# Patient Record
Sex: FEMALE | URBAN - METROPOLITAN AREA
[De-identification: names, ages, dates, MRNs, and addresses within clinical notes are randomized per-mention and may not be internally consistent; named-entity substitution may affect disease eponyms.]

---

## 2019-10-16 ENCOUNTER — OFFICE VISIT (OUTPATIENT)
Dept: ORTHOPEDIC SURGERY | Age: 64
End: 2019-10-16

## 2019-10-16 VITALS
DIASTOLIC BLOOD PRESSURE: 110 MMHG | WEIGHT: 123 LBS | RESPIRATION RATE: 16 BRPM | SYSTOLIC BLOOD PRESSURE: 157 MMHG | HEART RATE: 56 BPM | TEMPERATURE: 98 F

## 2019-10-16 DIAGNOSIS — M54.16 LEFT LUMBAR RADICULOPATHY: Primary | ICD-10-CM

## 2019-10-16 DIAGNOSIS — M71.30 SYNOVIAL CYST: ICD-10-CM

## 2019-10-16 RX ORDER — MONTELUKAST SODIUM 10 MG/1
TABLET ORAL
COMMUNITY
Start: 2016-08-31

## 2019-10-16 RX ORDER — CETIRIZINE HCL 10 MG
10 TABLET ORAL
COMMUNITY
Start: 2014-02-25

## 2019-10-16 RX ORDER — NAPROXEN 500 MG/1
TABLET ORAL
Qty: 30 TAB | Refills: 0 | Status: SHIPPED | OUTPATIENT
Start: 2019-10-16 | End: 2019-10-30

## 2019-10-16 RX ORDER — AMLODIPINE BESYLATE 10 MG/1
TABLET ORAL
COMMUNITY
Start: 2019-02-27

## 2019-10-16 NOTE — PATIENT INSTRUCTIONS
Back Pain: Care Instructions  Your Care Instructions    Back pain has many possible causes. It is often related to problems with muscles and ligaments of the back. It may also be related to problems with the nerves, discs, or bones of the back. Moving, lifting, standing, sitting, or sleeping in an awkward way can strain the back. Sometimes you don't notice the injury until later. Arthritis is another common cause of back pain. Although it may hurt a lot, back pain usually improves on its own within several weeks. Most people recover in 12 weeks or less. Using good home treatment and being careful not to stress your back can help you feel better sooner. Follow-up care is a key part of your treatment and safety. Be sure to make and go to all appointments, and call your doctor if you are having problems. It's also a good idea to know your test results and keep a list of the medicines you take. How can you care for yourself at home? · Sit or lie in positions that are most comfortable and reduce your pain. Try one of these positions when you lie down:  ? Lie on your back with your knees bent and supported by large pillows. ? Lie on the floor with your legs on the seat of a sofa or chair. ? Lie on your side with your knees and hips bent and a pillow between your legs. ? Lie on your stomach if it does not make pain worse. · Do not sit up in bed, and avoid soft couches and twisted positions. Bed rest can help relieve pain at first, but it delays healing. Avoid bed rest after the first day of back pain. · Change positions every 30 minutes. If you must sit for long periods of time, take breaks from sitting. Get up and walk around, or lie in a comfortable position. · Try using a heating pad on a low or medium setting for 15 to 20 minutes every 2 or 3 hours. Try a warm shower in place of one session with the heating pad. · You can also try an ice pack for 10 to 15 minutes every 2 to 3 hours.  Put a thin cloth between the ice pack and your skin. · Take pain medicines exactly as directed. ? If the doctor gave you a prescription medicine for pain, take it as prescribed. ? If you are not taking a prescription pain medicine, ask your doctor if you can take an over-the-counter medicine. · Take short walks several times a day. You can start with 5 to 10 minutes, 3 or 4 times a day, and work up to longer walks. Walk on level surfaces and avoid hills and stairs until your back is better. · Return to work and other activities as soon as you can. Continued rest without activity is usually not good for your back. · To prevent future back pain, do exercises to stretch and strengthen your back and stomach. Learn how to use good posture, safe lifting techniques, and proper body mechanics. When should you call for help? Call your doctor now or seek immediate medical care if:    · You have new or worsening numbness in your legs.     · You have new or worsening weakness in your legs. (This could make it hard to stand up.)     · You lose control of your bladder or bowels.    Watch closely for changes in your health, and be sure to contact your doctor if:    · You have a fever, lose weight, or don't feel well.     · You do not get better as expected. Where can you learn more? Go to http://shawanda-steve.info/. Enter T661 in the search box to learn more about \"Back Pain: Care Instructions. \"  Current as of: June 26, 2019  Content Version: 12.2  © 1869-0768 [employer]. Care instructions adapted under license by [employer] (which disclaims liability or warranty for this information). If you have questions about a medical condition or this instruction, always ask your healthcare professional. [place] [address] any warranty or liability for your use of this information.          Sciatica: Care Instructions  Your Care Instructions    Sciatica (say \"efg-LT-bm-kuh\") is an irritation of one of the sciatic nerves, which come from the spinal cord in the lower back. The sciatic nerves and their branches extend down through the buttock to the foot. Sciatica can develop when an injured disc in the back presses against a spinal nerve root. Its main symptom is pain, numbness, or weakness that is often worse in the leg or foot than in the back. Sciatica often will improve and go away with time. Early treatment usually includes medicines and exercises to relieve pain. Follow-up care is a key part of your treatment and safety. Be sure to make and go to all appointments, and call your doctor if you are having problems. It's also a good idea to know your test results and keep a list of the medicines you take. How can you care for yourself at home? · Take pain medicines exactly as directed. ? If the doctor gave you a prescription medicine for pain, take it as prescribed. ? If you are not taking a prescription pain medicine, ask your doctor if you can take an over-the-counter medicine. · Use heat or ice to relieve pain. ? To apply heat, put a warm water bottle, heating pad set on low, or warm cloth on your back. Do not go to sleep with a heating pad on your skin. ? To use ice, put ice or a cold pack on the area for 10 to 20 minutes at a time. Put a thin cloth between the ice and your skin. · Avoid sitting if possible, unless it feels better than standing. · Alternate lying down with short walks. Increase your walking distance as you are able to without making your symptoms worse. · Do not do anything that makes your symptoms worse. When should you call for help? Call 911 anytime you think you may need emergency care. For example, call if:    · You are unable to move a leg at all.   [facility] your doctor now or seek immediate medical care if:    · You have new or worse symptoms in your legs or buttocks. Symptoms may include:  ? Numbness or tingling. ? Weakness.   ? Pain.     · You lose bladder or bowel control.    Watch closely for changes in your health, and be sure to contact your doctor if:    · You are not getting better as expected. Where can you learn more? Go to http://shawanda-steve.info/. Enter [phone] in the search box to learn more about \"Sciatica: Care Instructions. \"  Current as of: June 26, 2019  Content Version: 12.2  © 5751-1064 [employer]. Care instructions adapted under license by [employer] (which disclaims liability or warranty for this information). If you have questions about a medical condition or this instruction, always ask your healthcare professional. [address] any warranty or liability for your use of this information.

## 2019-10-16 NOTE — PROGRESS NOTES
[address]  [address]  [place], [address] [place] [address] Street  Phone: [phone]  Fax: [phone]        Samantha Sunshine  : 1955  PCP: Dax Garcia MD      NEW PATIENT      ASSESSMENT AND PLAN     Diagnoses and all orders for this visit:    1. Left lumbar radiculopathy  -     REFERRAL TO PHYSICAL THERAPY  -     naproxen (NAPROSYN) 500 mg tablet; Take 1 PO Daily with food X 2 weeks    2. Synovial cyst       1. Advised to stay active as tolerated. 2. Discussed life style modification, PT, medication, spinal injection, and surgery as treatment options  3. Referral to PT  4. Rx of Naprosyn. Take consistently for 2 weeks. 5. Avoid repetitive bending, lifting, and twisting. Avoid lifting grandchildren - let them sit on lap. 6. Given information on back pain, sciatica    F/U 1 month      [address] [facility] To[facility] is seen today in consultation at the request of Dr. Cecilio Burch for complaints of back, hip, and thigh pain. HISTORY OF PRESENT ILLNESS  Elzbieta Flynn is a 59 y.o. female. Today pt c/o back, hip and thigh pain of 4 month duration. Pt denies any specific incident or injury that caused their pain. Pt has daily pain with better and worse days. She reports immediate pain with rising. Pt continues to grocery shop, but experiences pain. She notes she takes a break while cooking to sit down to relieve her pain. She notes new L toe numbness. Pt notes she experiences pain that wakes her while laying on her L side. Denies having prednisone since her pain began. Pt notes she prefers not to take medication. Location of pain: back  Does pain radiate into extremities: LLE hip, thigh throbbing. Toe numbness  Does patient have weakness: LLE gives out   Pt denies saddle paresthesias. Medications pt is on: BC powder temporary benefit. Pt denies recent ED visits or hospitalizations.  Denies persistent fevers, chills, weight changes, neurogenic bowel or bladder symptoms. Treatments patient has tried:  Physical therapy:No  Doing HEP: Unknown  Non-opioid medications: Yes  Spinal injections: No  Spinal surgery- No.   Last L MRI 2019: narrowing with effusion on L at L4-5     reviewed. PMHx of HTN. Pt is unemployed outside the home. Pain Assessment  10/16/2019   Location of Pain Back;Hip; Toe   Location Modifiers Left   Severity of Pain 6   Quality of Pain Aching   Quality of Pain Comment numbness tingling   Duration of Pain Persistent   Frequency of Pain Constant   Aggravating Factors (No Data)   Aggravating Factors Comment positional   Relieving Factors Nothing       MRI lumbar spine 8/22/19  Result Impression   :    1. Transitional lumbosacral anatomy as above. 2. Moderate left foraminal stenosis at L4-L5, potentially affecting the left L4 nerve root. Recommend correlation for evidence of left L4 radicular symptoms. 3. Moderate facet arthropathy on the left at the L3-L4 and more pronounced at the L4-L5 level with an adjacent synovial cyst, trace left facet joint effusion, and surrounding edema compatible synovitis. These findings are most likely degenerative. Inflammatory/infectious synovitis considered much less likely unless there are clinical signs of infection. PAST MEDICAL HISTORY   Past Medical History:   Diagnosis Date    Hypertension        Past Surgical History:   Procedure Laterality Date    HX DILATION AND CURETTAGE         MEDICATIONS      Current Outpatient Medications   Medication Sig Dispense Refill    amLODIPine (NORVASC) 10 mg tablet amlodipine 10 mg tablet      cetirizine (ZYRTEC) 10 mg tablet Take 10 mg by mouth.  montelukast (SINGULAIR) 10 mg tablet Singulair 10 mg tablet   Take 1 tablet every day by oral route.       naproxen (NAPROSYN) 500 mg tablet Take 1 PO Daily with food X 2 weeks 30 Tab 0       ALLERGIES  Allergies not on file       SOCIAL HISTORY    Social History     Socioeconomic History    Marital status:      Spouse name: Not on file    Number of children: Not on file    Years of education: Not on file    Highest education level: Not on file   Occupational History    Not on file   Social Needs    Financial resource strain: Not on file    Food insecurity:     Worry: Not on file     Inability: Not on file    Transportation needs:     Medical: Not on file     Non-medical: Not on file   Tobacco Use    Smoking status: Never Smoker    Smokeless tobacco: Never Used   Substance and Sexual Activity    Alcohol use: Not on file    Drug use: Not on file    Sexual activity: Not on file   Lifestyle    Physical activity:     Days per week: Not on file     Minutes per session: Not on file    Stress: Not on file   Relationships    Social connections:     Talks on phone: Not on file     Gets together: Not on file     Attends Alevism service: Not on file     Active member of club or organization: Not on file     Attends meetings of clubs or organizations: Not on file     Relationship status: Not on file    Intimate partner violence:     Fear of current or ex partner: Not on file     Emotionally abused: Not on file     Physically abused: Not on file     Forced sexual activity: Not on file   Other Topics Concern     Service Not Asked    Blood Transfusions Not Asked    Caffeine Concern Not Asked    Occupational Exposure Not Asked   Anshu Kotyk Hazards Not Asked    Sleep Concern Not Asked    Stress Concern Not Asked    Weight Concern Not Asked    Special Diet Not Asked    Back Care Not Asked    Exercise Not Asked    Bike Helmet Not Asked    Seat Belt Not Asked    Self-Exams Not Asked   Social History Narrative    Not on file       FAMILY HISTORY    Family History   Problem Relation Age of Onset    Heart Disease Mother     Diabetes Father     Cancer Sister          REVIEW OF SYSTEMS  Review of Systems   Constitutional: Negative for chills, fever and weight loss. Respiratory: Negative for shortness of breath. Cardiovascular: Negative for chest pain. Gastrointestinal: Negative for constipation. Negative for fecal incontinence   Genitourinary: Negative for dysuria. Negative for urinary incontinence   Musculoskeletal:        Per HPI   Skin: Negative for rash. Neurological: Positive for tingling and focal weakness. Negative for dizziness, tremors and headaches. Endo/Heme/Allergies: Does not bruise/bleed easily. Psychiatric/Behavioral: The patient does not have insomnia. PHYSICAL EXAMINATION  Visit Vitals  BP (!) 157/110 (BP 1 Location: Left arm, BP Patient Position: Sitting)   Pulse (!) 56   Temp 98 °F (36.7 °C) (Oral)   Resp 16   Wt 123 lb (55.8 kg)          Accompanied by self. Constitutional:  Well developed, well nourished, in no acute distress. Psychiatric: Affect and mood are appropriate. Integumentary: No rashes or abrasions noted on exposed areas. Cardiovascular/Peripheral Vascular: No peripheral edema is noted BLE. SPINE/MUSCULOSKELETAL EXAM        Lumbar spine:  No rash, ecchymosis, or gross obliquity. No fasciculations. No focal atrophy is noted. Tenderness to palpation none lumbar spine. No tenderness to palpation at the sciatic notch. SI joints non-tender. Trochanters non tender. MOTOR:       Hip Flex  Quads Hamstrings Ankle DF EHL Ankle PF   Right +4/5 +4/5 +4/5 +4/5 +4/5 +4/5   Left +4/5 +4/5 +4/5 +4/5 4/5 +4/5   Mild eversion weakness on L. DTRs are 2+ patella, and 1+ Achilles. Straight Leg raise negative. No difficulty with tandem gait. LLE pain with Heel walk. LLE pain with Toe rise. Ambulation without assistive device. FWB. Written by Estrellita Valentino, as dictated by Virginia Sage MD.    I, Dr. Virginia Sage MD, confirm that all documentation is accurate. Ms. Trisha Mitchell may have a reminder for a \"due or due soon\" health maintenance.  I have asked that she contact her primary care provider for follow-up on this health maintenance.

## 2019-10-16 NOTE — PROGRESS NOTES
Verbal order Per Dr. James Mensah PT- Left Lumbar Radiculopathy; Naprosyn 500 mg 1 PO BID with food X 2 weeks Dispense 30 and 0 refills

## 2019-10-16 NOTE — PROGRESS NOTES
Josue Sauceda presents today for   Chief Complaint   Patient presents with    Back Pain     NEW PATIENT    Hip Pain       Is someone accompanying this pt? NO    Is the patient using any DME equipment during OV? NO    Depression Screening:  3 most recent PHQ Screens 10/16/2019   Little interest or pleasure in doing things Not at all   Feeling down, depressed, irritable, or hopeless Not at all   Total Score PHQ 2 0       Learning Assessment:  Learning Assessment 10/16/2019   PRIMARY LEARNER Patient   PRIMARY LANGUAGE ENGLISH   LEARNER PREFERENCE PRIMARY DEMONSTRATION   ANSWERED BY patient   RELATIONSHIP SELF         Coordination of Care:  1. Have you been to the ER, urgent care clinic since your last visit? NO  Hospitalized since your last visit? NO    2. Have you seen or consulted any other health care providers outside of the [address] [place] [address] since your last visit? NO Include any pap smears or colon screening.  NO    Last  Checked 10/16/19

## 2019-10-24 ENCOUNTER — HOSPITAL ENCOUNTER (OUTPATIENT)
Dept: PHYSICAL THERAPY | Age: 64
Discharge: HOME | End: 2019-10-24
Payer: COMMERCIAL

## 2019-10-24 PROCEDURE — 97110 THERAPEUTIC EXERCISES: CPT

## 2019-10-24 PROCEDURE — 97161 PT EVAL LOW COMPLEX 20 MIN: CPT

## 2019-10-24 PROCEDURE — 97140 MANUAL THERAPY 1/> REGIONS: CPT

## 2019-10-24 NOTE — PROGRESS NOTES
PT DAILY TREATMENT NOTE 10-18    Patient Name: Starr Williamson  Date:10/24/2019  : 1955  [x]  Patient  Verified  Payor: Wendy Pierce / Plan: [id number] [employer] [place] HMO / Product Type: HMO /    In time:1108  Out time:1157  Total Treatment Time (min): 52  Visit #: 1 of 10    Medicare/BCBS Only   Total Timed Codes (min):    1:1 Treatment Time:          Treatment Area: Lower back pain [M54.5]      SUBJECTIVE  Pain Level (0-10 scale): 1  []constant [x]intermittent []improving []worsening []no change since onset     Any medication changes, allergies to medications, adverse drug reactions, diagnosis change, or new procedure performed?: [x] No    [] Yes (see summary sheet for update)  Subjective functional status/changes:       Subjective: Patient is a 59 y.o. female referred to PT with the above Dx. Patient seen today for c/o pain in the left side of the back and going into the left LE with numbness in the left foot/big toe. Limited ability to stand for long periods when cooking, when working out in the yard or cleaning house. Pain in the low back when standing after bending forward working in the yard. Pain when holding 3year old grand daughter. Onset Apri or May 2019 with worsening during usual activity. Pt reports that she was reaching for something and felt a pull in her back. Limited walking tolerance due to pain       Patient presents to PT with an impaired gait, decreased balance, decreased strength, decreased flexibility, and decreased mobility. Patient s/s appear to be consistent w/ diagnosis. Patient demonstrates the potential to make functional gains within a reasonable time frame. Patient will benefit from skilled PT to address impairments and improve functional mobility, strength, gait and balance for an improved quality of life.   Fall Risk Assessment: Patient demonstrates no Fall Risk     Mechanism of Injury: None noted    Comorbidities: HTN, OA,     FOTO: 50 / 66 in 11 visits    Goal: Be able to walk around and do things without the sharp pain    PLOF: No left back pain or radicular pain in the left LE. Able to work in the yard and do all standing and cooking without difficulty. Able to hold grand baby without difficulty    Health Status: Good      What type of work do you do? N/A    Living Situation/Domestic Life: Lives at home with  and son in a single story home with steps going into the home  Mobility: (I)  Self Care (I)    What type of daily activities/hobbies?  Yard work, cooking,    Limitations to Activity/Recreation/PLOF: Limited standing, walking lifting     Barriers: [x]pain []financial []time []transportation []other    Motivation: High    FABQ Score: []low []elevate    Cognition: A & O x 3    Other:    Risk For Falls:   [x]No  []low []elevate           OBJECTIVE/EXAMINATION  Demonstrated Balance: Good       SLS: Right 30  Left  30     Tandem Stance:  NT        Romberg:  EO 30   EC 30  Mobility: (I)  Gait: Decr (B) pelvic sway, Decr (B) Hip Flx, Left Antalgic gait  - Depressed left crest  - Post Rot left innominate with depressed left ASIS  - decrease mobility (B) innominate in storks stance  - Fingertip to floor 9cm  - Hip flx MMT right 4+ left 4  - (+) Left Piriformis test  - (+) Left Slump Test  - (B) trunk rot at 60%  - TTP left ADD/Soleus/Gastroc/post knee/lateral thigh             20 min [x]Eval                  []Re-Eval         14 min Therapeutic Exercise:  [] See flow sheet : develop and instruct HEP   Rationale: increase ROM, increase strength and improve coordination to improve the patients ability to tolerate normal activity     10 min Manual Therapy:  (B) Innominate p/a mobs, (B) LE LAD, (B) L/S and T/S Rot mobs     Rationale: decrease pain, increase ROM and increase tissue extensibility to perform normal activity                                         With   [x] TE   [] TA   [] neuro   [] other: Patient Education: [x] Review HEP    [] Progressed/Changed HEP based on:   [] positioning   [] body mechanics   [] transfers   [] heat/ice application    [] other:       Other Objective/Functional Measures:        Pain Level (0-10 scale) post treatment: 3-4     ASSESSMENT/Changes in Function:   - Improved pelvic alignment and mobility after treatment      [x]  See Plan of Care  []  See progress note/recertification  []  See Discharge Summary           Progress towards goals / Updated goals:  Short Term Goals: To be accomplished in 5 treatments:  1.  Pt will be compliant and independent with HEP in order to facilitate PT sessions and aid with self management             Eval Status:  Initiated             Current Status:  2.  Pt to tolerate 30 min or more of TE and/or Interventions w/o increased s/s             Eval Status:  Initiated             Current Status:     Long Term Goals: To be accomplished in 10 treatments:  1.  Pt will report 50% improvement or better with low back pain to show a significant increase in function and the ability to perform daily activity with little to no sharp pain that limits activity             Eval Status:  Initiated             Current Status:  2.  Pt will have a negative left slump test to aid in decreasing neural tension in the left LE and decrease numbness in the left isaiah/big toe to tolerate standing to perform yard work  [employer] Status:  Can't stand long without numbness in the left foot/great toe             Current Status:  3.  Pt will demonstrate the ability to lift and hold 20# for 1 min to be able to lift and carry grand daughter without difficulty             Eval Status:  Not able to hold grand daughter without difficulty             Current Status:  4.  Pt will demonstrate the ability to stand and perform general activity/exercise for 30 min or more for carryover to standing to prepare a meal with little difficulty               Eval Status:  Initiated             Current Status:  5.  Pt will improve FOTO score to 66 in 11 visits to show significant improvement in function for progress to little difficulty performing usual work or house hold activity             Eval Status: 50             Current Status:    PLAN  [x]  Upgrade activities as tolerated     [x]  Continue plan of care  []  Update interventions per flow sheet       []  Discharge due to:_  []  Other:_         Jose Perkins, PT 10/24/2019  11:07 AM    Future Appointments   Date Time Provider José Dailey   11/21/2019  3:15 PM Ninoska Chester  [address]

## 2019-10-24 NOTE — PROGRESS NOTES
[facility] at [address] [place] [address] [address], [address] [place]  [place], [facility] [address]  Phone: [phone]      Fax:  [phone]    Plan of Care/ Statement of Necessity for Physical Therapy Services  Patient name: Venu Martin Start of Care: 10/24/2019   Referral source: Ninoska Chester MD : 1955    Medical Diagnosis: Lower back pain [M54.5]  Payor: Aster Casillas / Plan: Atiya Don HMO / Product Type: HMO /  Onset Date:April - May with recent worsening during usual activity    Treatment Diagnosis: Neural tension left LE, Impaired gait, pain with walking   Prior Hospitalization: see medical history Provider#: [id number]   Medications: Verified on Patient summary List    Comorbidities: HTN, OA   Prior Level of Function: No left back pain or radicular pain in the left LE. Able to work in the yard and do all standing and cooking without difficulty. Able to hold grand baby without difficulty      The Plan of Care and following information is based on the information from the initial evaluation. Assessment/ key information: Patient is a 59 y.o. female referred to PT with the above Dx. Patient seen today for c/o pain in the left side of the back and going into the left LE with numbness in the left foot/big toe. Limited ability to stand for long periods when cooking, when working out in the yard or cleaning house. Pain in the low back when standing after bending forward working in the yard. Pain when holding 3year old grand daughter. Onset Apri or May 2019 with worsening during usual activity. Pt reports that she was reaching for something and felt a pull in her back. Limited walking tolerance due to pain        Patient presents to PT with an impaired gait, decreased strength, decreased flexibility, and decreased mobility of the left LE. She has a pelvic obliquity, (+) Left slump test, (+) Left piriformis test,   Patient s/s appear to be consistent w/ diagnosis.   Patient demonstrates the potential to make functional gains within a reasonable time frame. Patient will benefit from skilled PT to address impairments and improve functional mobility, strength, gait and balance for an improved quality of life.   Fall Risk Assessment: Patient demonstrates no Fall Risk   Evaluation Complexity History LOW Complexity : Zero comorbidities / personal factors that will impact the outcome / POC; Examination MEDIUM Complexity : 3 Standardized tests and measures addressing body structure, function, activity limitation and / or participation in recreation  ;Presentation LOW Complexity : Stable, uncomplicated  ;Clinical Decision Making MEDIUM Complexity : FOTO score of 26-74  Overall Complexity Rating: LOW   Problem List: pain affecting function, decrease ROM, decrease strength, impaired gait/ balance, decrease ADL/ functional abilitiies, decrease activity tolerance, decrease flexibility/ joint mobility, decrease transfer abilities and other FOTO = 50   Treatment Plan may include any combination of the following: Therapeutic exercise, Therapeutic activities, Neuromuscular re-education, Physical agent/modality, Gait/balance training, Manual therapy, Patient education, Self Care training, Functional mobility training and Home safety training  Patient / Family readiness to learn indicated by: asking questions, trying to perform skills and interest  Persons(s) to be included in education: patient (P)  Barriers to Learning/Limitations: None  Patient Goal (s): Be able to walk around and do things without the sharp pain  Patient Self Reported Health Status: good  Rehabilitation Potential: good    Short Term Goals: To be accomplished in 5 treatments:  1.  Pt will be compliant and independent with HEP in order to facilitate PT sessions and aid with self management             Eval Status:  Initiated             Current Status:  2.  Pt to tolerate 30 min or more of TE and/or Interventions w/o increased s/s             Eval Status:  Initiated             Current Status:     Long Term Goals: To be accomplished in 10 treatments:  1.  Pt will report 50% improvement or better with low back pain to show a significant increase in function and the ability to perform daily activity with little to no sharp pain that limits activity             Eval Status:  Initiated             Current Status:  2.  Pt will have a negative left slump test to aid in decreasing neural tension in the left LE and decrease numbness in the left isaiah/big toe to tolerate standing to perform yard work  [employer] Status:  Can't stand long without numbness in the left foot/great toe             Current Status:  3.  Pt will demonstrate the ability to lift and hold 20# for 1 min to be able to lift and carry grand daughter without difficulty             Eval Status:  Not able to hold grand daughter without difficulty             Current Status:  4.  Pt will demonstrate the ability to stand and perform general activity/exercise for 30 min or more for carryover to standing to prepare a meal with little difficulty               Eval Status:  Initiated             Current Status:  5.  Pt will improve FOTO score to 66 in 11 visits to show significant improvement in function for progress to little difficulty performing usual work or house hold activity             Eval Status: 50             Current Status:    Frequency / Duration: Patient to be seen 2-3 times per week for 10 treatments. Patient/ Caregiver education and instruction: Diagnosis, prognosis, self care, activity modification and exercises   [x]  Plan of care has been reviewed with FRANKLIN Ybarra PT 10/24/2019 11:07 AM  _____________________________________________________________________  I certify that the above Therapy Services are being furnished while the patient is under my care. I agree with the treatment plan and certify that this therapy is necessary.     [facility] Signature:____________Date:_________TIME:________    [facility] [employer], Date and Time must be completed for valid certification **    Please sign and return to [facility] at [address] [facility]  [place], [facility] [address] [facility] [address]  Phone: [phone]      Fax:  [phone]

## 2019-10-28 ENCOUNTER — HOSPITAL ENCOUNTER (OUTPATIENT)
Dept: PHYSICAL THERAPY | Age: 64
Discharge: HOME | End: 2019-10-28
Payer: COMMERCIAL

## 2019-10-28 PROCEDURE — 97140 MANUAL THERAPY 1/> REGIONS: CPT

## 2019-10-28 PROCEDURE — 97110 THERAPEUTIC EXERCISES: CPT

## 2019-10-28 NOTE — PROGRESS NOTES
PT DAILY TREATMENT NOTE 10-18    Patient Name: Mark Khan  Date:10/28/2019  : 1955  [x]  Patient  Verified  Payor: Kam Bashir / Plan: [id number] HMO / Product Type: HMO /    In time:0905  Out [id number]  Total Treatment Time (min): 41  Visit #: 2 of 10    Medicare/BCBS Only   Total Timed Codes (min):    1:1 Treatment Time:          Treatment Area: Lower back pain [M54.5]    SUBJECTIVE  Pain Level (0-10 scale): 0  Any medication changes, allergies to medications, adverse drug reactions, diagnosis change, or new procedure performed?: [x] No    [] Yes (see summary sheet for update)  Subjective functional status/changes:   [] No changes reported  Doing much better since initial visit    OBJECTIVE        31 min Therapeutic Exercise:  [] See flow sheet :   Rationale: increase ROM, increase strength and improve coordination to improve the patients ability to tolerate increased activity    10 min Manual Therapy:  Inf glide right sacral base, (B) Innominate p/a mobs, (B) LE LAD, (B) L/S and T/S Rot mobs     Rationale: decrease pain, increase ROM and increase tissue extensibility to tolerate increased activity          With   [] TE   [] TA   [] neuro   [] other: Patient Education: [x] Review HEP    [] Progressed/Changed HEP based on:   [] positioning   [] body mechanics   [] transfers   [] heat/ice application    [] other:      Other Objective/Functional Measures:   - elevated right sacral base     Pain Level (0-10 scale) post treatment: 1    ASSESSMENT/Changes in Function:   - Improved mobility and alignment after treatment  - Minor increased pain after performing exercises today    Patient will continue to benefit from skilled PT services to modify and progress therapeutic interventions, address functional mobility deficits, address ROM deficits, address strength deficits, analyze and address soft tissue restrictions and analyze and cue movement patterns to attain remaining goals.      []  See Plan of Care  [] See progress note/recertification  []  See Discharge Summary         Progress towards goals / Updated goals:  Short Term Goals: To be accomplished in 5 treatments:  1. Pt will be compliant and independent with HEP in order to facilitate PT sessions and aid with self management             Eval Status:  Initiated             Current Status: Patient reports compliance with HEP with minor questions on technique 10/28/19  2. Pt to tolerate 30 min or more of TE and/or Interventions w/o increased s/s             Eval Status:  Initiated             Current Status: Progressing at 41 min with minor increased pain to 1/10 10/28/19     Long Term Goals: To be accomplished in 10 treatments:  1.  1.  Pt will report 50% improvement or better with low back pain to show a significant increase in function and the ability to perform daily activity with little to no sharp pain that limits activity             Eval Status:  Initiated             Current Status:  2. Pt will have a negative left slump test to aid in decreasing neural tension in the left LE and decrease numbness in the left isaiah/big toe to tolerate standing to perform yard work             Eval Status:  Can't stand long without numbness in the left foot/great toe             Current Status:  3. Pt will demonstrate the ability to lift and hold 20# for 1 min to be able to lift and carry grand daughter without difficulty             Eval Status:  Not able to hold grand daughter without difficulty             Current Status:  4. Pt will demonstrate the ability to stand and perform general activity/exercise for 30 min or more for carryover to standing to prepare a meal with little difficulty               Eval Status:  Initiated             Current Status:  5.   Pt will improve FOTO score to 66 in 11 visits to show significant improvement in function for progress to little difficulty performing usual work or house hold activity             Eval Status: 50             Current Status:     PLAN  [x]  Upgrade activities as tolerated     [x]  Continue plan of care  []  Update interventions per flow sheet       []  Discharge due to:_  []  Other:_      Harriett Main, PT 10/28/2019  7:33 PM    Future Appointments   Date Time Provider José Dailey   10/30/2019  9:00 AM Courtney Mancuso, [facility]   11/6/2019 11:00 AM Julieta Ramos [facility]   11/13/2019  9:30 AM Courtney Mancuso PT [facility]   11/21/2019  3:15 PM Brendan Rodriguez  [address]

## 2019-10-30 ENCOUNTER — HOSPITAL ENCOUNTER (OUTPATIENT)
Dept: PHYSICAL THERAPY | Age: 64
Discharge: HOME | End: 2019-10-30
Payer: COMMERCIAL

## 2019-10-30 PROCEDURE — 97110 THERAPEUTIC EXERCISES: CPT

## 2019-10-30 PROCEDURE — 97530 THERAPEUTIC ACTIVITIES: CPT

## 2019-10-30 NOTE — PROGRESS NOTES
PT DAILY TREATMENT NOTE 10-18    Patient Name: Nithin Alexandra  Date:10/30/2019  : 1955  [x]  Patient  Verified  Payor: Rylee Marte / Plan: [id number] HMO / Product Type: HMO /    In time:0905  Out [id number]  Total Treatment Time (min): 50  Visit #: 3 of 10    Medicare/BCBS Only   Total Timed Codes (min):    1:1 Treatment Time:          Treatment Area: Lower back pain [M54.5]    SUBJECTIVE  Pain Level (0-10 scale): 2  Any medication changes, allergies to medications, adverse drug reactions, diagnosis change, or new procedure performed?: [x] No    [] Yes (see summary sheet for update)  Subjective functional status/changes:   [] No changes reported  Minor pain in back but not going down the leg today.   Pt reports 50% improvement since start of treatment    OBJECTIVE      42 min Therapeutic Exercise:  [] See flow sheet :   Rationale: increase ROM, increase strength and improve coordination to improve the patients ability to perform increased activity    8 min Therapeutic Activity:  [x]  See flow sheet : TM ambulation   Rationale: increase ROM, increase strength and improve coordination  to improve the patients ability to improve walking tolerance           With   [x] TE   [] TA   [] neuro   [] other: Patient Education: [x] Review HEP    [] Progressed/Changed HEP based on:   [] positioning   [] body mechanics   [] transfers   [] heat/ice application    [] other:      Other Objective/Functional Measures:   - Good pelvic alignment and mobility today     Pain Level (0-10 scale) post treatment: 1    ASSESSMENT/Changes in Function:   - Pt with improved functional mobility  - Pt with good pelvic alignment and mobility today  - Good response to treatment program with decreased pain and improved mobility  -     Patient will continue to benefit from skilled PT services to modify and progress therapeutic interventions, address functional mobility deficits, address ROM deficits, address strength deficits, analyze and address soft tissue restrictions and analyze and cue movement patterns to attain remaining goals. []  See Plan of Care  []  See progress note/recertification  []  See Discharge Summary         Progress towards goals / Updated goals:  Short Term Goals: To be accomplished in 5 treatments:  1.  Pt will be compliant and independent with HEP in order to facilitate PT sessions and aid with self management             Eval Status:  Initiated             Current Status: Patient reports compliance with HEP with minor questions on technique 10/28/19  2.  Pt to tolerate 30 min or more of TE and/or Interventions w/o increased s/s             Eval Status:  Initiated             Current Status: Progressing at 41 min with minor increased pain to 1/10 10/28/19     Long Term Goals: To be accomplished in 10 treatments:  1.  1.  Pt will report 50% improvement or better with low back pain to show a significant increase in function and the ability to perform daily activity with little to no sharp pain that limits activity             Eval Status:  Initiated             Current Status:  2.  Pt will have a negative left slump test to aid in decreasing neural tension in the left LE and decrease numbness in the left isaiah/big toe to tolerate standing to perform yard work  [employer] Status:  Can't stand long without numbness in the left foot/great toe             Current Status: Progressing, Pt showing decreased radicular symptoms with no toe numbness today 10/30/19  3.  Pt will demonstrate the ability to lift and hold 20# for 1 min to be able to lift and carry grand daughter without difficulty             Eval Status:  Not able to hold grand daughter without difficulty             Current Status:  4.  Pt will demonstrate the ability to stand and perform general activity/exercise for 30 min or more for carryover to standing to prepare a meal with little difficulty               Eval Status:  Initiated             Current Status:  5.  Pt will improve FOTO score to 66 in 11 visits to show significant improvement in function for progress to little difficulty performing usual work or house hold activity             Eval Status: 50             Current Status:     PLAN  [x]  Upgrade activities as tolerated     [x]  Continue plan of care  []  Update interventions per flow sheet       []  Discharge due to:_  []  Other:_      Elzbieta Vo, PT 10/30/2019  9:40 AM    Future Appointments   Date Time Provider José Dailey   11/6/2019 11:00 AM Marcelle Oakley [facility]   11/13/2019  9:30 AM Irwin Cavazos, PT [facility]   11/21/2019  3:15 PM Sneha Byrne  [address]

## 2019-11-06 ENCOUNTER — HOSPITAL ENCOUNTER (OUTPATIENT)
Dept: PHYSICAL THERAPY | Age: 64
Discharge: HOME | End: 2019-11-06
Payer: COMMERCIAL

## 2019-11-06 PROCEDURE — 97530 THERAPEUTIC ACTIVITIES: CPT

## 2019-11-06 PROCEDURE — 97110 THERAPEUTIC EXERCISES: CPT

## 2019-11-06 NOTE — PROGRESS NOTES
PT DAILY TREATMENT NOTE 10-18    Patient Name: Cortney Jean  Date:2019  : 1955  [x]  Patient  Verified  Payor: Karol Watson / Plan: Johanne Aaron HMO / Product Type: HMO /    In time:11:00  Out time:11:40  Total Treatment Time (min): 40  Visit #: 4 of 10    Treatment Area: Lower back pain [M54.5]    SUBJECTIVE  Pain Level (0-10 scale): 0/10  Any medication changes, allergies to medications, adverse drug reactions, diagnosis change, or new procedure performed?: [x] No    [] Yes (see summary sheet for update)  Subjective functional status/changes:   [] No changes reported  \"This is really helping me. \"    OBJECTIVE      32 min Therapeutic Exercise:  [] See flow sheet :   Rationale: increase ROM, increase strength and improve coordination to improve the patients ability to perform increased activity    8 min Therapeutic Activity:  [x]  See flow sheet : TM ambulation   Rationale: increase ROM, increase strength and improve coordination  to improve the patients ability to improve walking tolerance           With   [x] TE   [] TA   [] neuro   [] other: Patient Education: [x] Review HEP    [] Progressed/Changed HEP based on:   [] positioning   [] body mechanics   [] transfers   [] heat/ice application    [] other:      Other Objective/Functional Measures: Alignment WNLs    Pain Level (0-10 scale) post treatment: 1    ASSESSMENT/Changes in Function: Pt says she is feeling good and would like to be D/C'd next visit. Pt reported some discomfort after therapy, but stated she was okay. Patient will continue to benefit from skilled PT services to modify and progress therapeutic interventions, address functional mobility deficits, address ROM deficits, address strength deficits, analyze and address soft tissue restrictions and analyze and cue movement patterns to attain remaining goals.      []  See Plan of Care  []  See progress note/recertification  []  See Discharge Summary         Progress towards goals / Updated goals:  Short Term Goals: To be accomplished in 5 treatments:  1.  Pt will be compliant and independent with HEP in order to facilitate PT sessions and aid with self management             Eval Status:  Initiated             Current Status: Patient reports compliance with HEP with minor questions on technique 10/28/19  2.  Pt to tolerate 30 min or more of TE and/or Interventions w/o increased s/s             Eval Status:  Initiated             Current Status: Progressing at 41 min with minor increased pain to 1/10 10/28/19     Long Term Goals: To be accomplished in 10 treatments:  1.  1.  Pt will report 50% improvement or better with low back pain to show a significant increase in function and the ability to perform daily activity with little to no sharp pain that limits activity             Eval Status:  Initiated             Current Status:MET. Pt reports 80% improvement. 11/6/19  2.  Pt will have a negative left slump test to aid in decreasing neural tension in the left LE and decrease numbness in the left isaiah/big toe to tolerate standing to perform yard work  [employer] Status:  Can't stand long without numbness in the left foot/great toe             Current Status: Progressing, Pt showing decreased radicular symptoms with no toe numbness today 10/30/19  3.  Pt will demonstrate the ability to lift and hold 20# for 1 min to be able to lift and carry grand daughter without difficulty             Eval Status:  Not able to hold grand daughter without difficulty             Current Status:MET. Pt was able to hold 20lbs for 1 mins with no pain, just fatigue. 11/6/19  4.  Pt will demonstrate the ability to stand and perform general activity/exercise for 30 min or more for carryover to standing to prepare a meal with little difficulty               Eval Status:  Initiated             Current Status:MET. Pt is able to stand for an hour without pain, just fatigue.  11/6/19  5.  Pt will improve FOTO score to 66 in 11 visits to show significant improvement in function for progress to little difficulty performing usual work or house hold activity             Eval Status: 50             Current Status:     PLAN  [x]  Upgrade activities as tolerated     [x]  Continue plan of care  []  Update interventions per flow sheet       []  Discharge due to:_  []  Other:_      Jeremy Fernandes PTA 11/6/2019  9:40 AM    Future Appointments   Date Time Provider José Dailey   11/13/2019  9:30 AM Lashaun Lynn, PT [facility]   11/21/2019  3:15 PM Thao Kaplan  [address]

## 2019-11-13 ENCOUNTER — HOSPITAL ENCOUNTER (OUTPATIENT)
Dept: PHYSICAL THERAPY | Age: 64
Discharge: HOME | End: 2019-11-13
Payer: COMMERCIAL

## 2019-11-13 PROCEDURE — 97110 THERAPEUTIC EXERCISES: CPT

## 2019-11-13 NOTE — PROGRESS NOTES
Physical Therapy Discharge Instructions      [facility] at [address] [facility] [address],[address]  Phone: [phone]      Fax:  [phone]    Patient: Sumeet Marie  : 1955      Continue Home Exercise Program Daily       Follow up with MD:     [] Upon completion of therapy     [x] As needed      Recommendations:     [x]   Return to activity with home program    []   Return to activity with the following modifications:       []Post Rehab Program    []Join Independent aquatic program     []Return to/join local gym        Additional Comments:          Annette Newman, PT 2019 9:59 AM

## 2019-11-13 NOTE — PROGRESS NOTES
PT DISCHARGE DAILY NOTE AND [id number]    Patient name: Alejandro Weinstein Start of Care: 10/24/2019   Referral source: Mihaela Carty MD : 1955               Medical Diagnosis: Lower back pain [M54.5]  Payor: Yeni Vance / Plan: [id number] HMO / Product Type: HMO /  Onset Date:April - May with recent worsening during usual activity               Treatment Diagnosis: Neural tension left LE, Impaired gait, pain with walking   Prior Hospitalization: see medical history Provider#: [id number]   Medications: Verified on Patient summary List    Comorbidities: HTN, OA   Prior Level of Function: No left back pain or radicular pain in the left LE.  Able to work in the yard and do all standing and cooking without difficulty.  Able to hold grand baby without difficulty    Visits from Start of Care: 5    Missed Visits: 0    Reporting Period : 10/24/19 to 19    Date:2019  : 1955  [x]  Patient  Verified  Payor: Yeni Vacne / Plan: [id number] HMO / Product Type: HMO /    In time:930  Out time:1040  Total Treatment Time (min): 50  Visit #: 5 of 10    Medicare/BCBS Only   Total Timed Codes (min):    1:1 Treatment Time:          SUBJECTIVE  Pain Level (0-10 scale): 0.5  Any medication changes, allergies to medications, adverse drug reactions, diagnosis change, or new procedure performed?: [x] No    [] Yes (see summary sheet for update)  Subjective functional status/changes:   [] No changes reported  Able to hold grand daughter and stand and cook with no trouble    OBJECTIVE      50 min Therapeutic Exercise:  [x] See flow sheet :   Rationale: increase ROM, increase strength and improve coordination to improve the patients ability to return to normal activity     With   [] TE   [] TA   [] neuro   [] other: Patient Education: [x] Review HEP    [] Progressed/Changed HEP based on:   [] positioning   [] body mechanics   [] transfers   [] heat/ice application    [] other:      Other Objective/Functional Measures:   - (+) Left Slump test  - Good pelvic alignment and mobility  - Goals achieved as noted below       Pain Level (0-10 scale) post treatment: 0    Summary of Care:  Progress towards goals / Updated goals:  Short Term Goals: To be accomplished in 5 treatments:  1.  Pt will be compliant and independent with HEP in order to facilitate PT sessions and aid with self management             Eval Status:  Initiated             Current Status: Met Patient reports compliance with HEP with minor questions on technique 10/28/19  2.  Pt to tolerate 30 min or more of TE and/or Interventions w/o increased s/s             Eval Status:  Initiated             Current Status: Met at 41 min with minor increased pain to 1/10 10/28/19     Long Term Goals: To be accomplished in 10 treatments:  1.  1.  Pt will report 50% improvement or better with low back pain to show a significant increase in function and the ability to perform daily activity with little to no sharp pain that limits activity             Eval Status:  Initiated             Current Status:MET. Pt reports 80% improvement. 11/6/19  2.  Pt will have a negative left slump test to aid in decreasing neural tension in the left LE and decrease numbness in the left isaiah/big toe to tolerate standing to perform yard work  [employer] Status:  Can't stand long without numbness in the left foot/great toe             Current Status: Progressing, Pt showing decreased radicular symptoms but continues to have a (+) Left slump test with no toe numbness today 11/13/19  3.  Pt will demonstrate the ability to lift and hold 20# for 1 min to be able to lift and carry grand daughter without difficulty             Eval Status:  Not able to hold grand daughter without difficulty             Current Status:MET. Pt was able to hold 20lbs for 1 mins with no pain, just fatigue. 11/6/19  4.  Pt will demonstrate the ability to stand and perform general activity/exercise for 30 min or more for carryover to standing to prepare a meal with little difficulty               Eval Status:  Initiated             Current Status:MET. Pt is able to stand for an hour without pain, just fatigue. 11/6/19  5.  Pt will improve FOTO score to 66 in 11 visits to show significant improvement in function for progress to little difficulty performing usual work or house hold activity             Eval Status: 50             Current Status: Met at 71    ASSESSMENT/Changes in Function: Patient has shown good progress with this treatment program. Pain as of last visit was 0.5/10. Patient has shown decreased pain and increased strength and mobility with improved ability to stand and cook and hold her granddaughter with little to no difficulty. Patient reports 80% improvement with overall involvement. FOTO score is 71. All STG/LTGs achieved as identified below.     Fall Risk Assessment: Patient demonstrates no Fall Risk     Thank you for this referral!      PLAN  [x]Discontinue therapy: [x]Patient has reached or is progressing toward set goals      []Patient is non-compliant or has abdicated      []Due to lack of appreciable progress towards set goals    Keren Dalal PT 11/13/2019  10:08 AM

## 2019-11-21 ENCOUNTER — OFFICE VISIT (OUTPATIENT)
Dept: ORTHOPEDIC SURGERY | Age: 64
End: 2019-11-21

## 2019-11-21 VITALS
OXYGEN SATURATION: 98 % | DIASTOLIC BLOOD PRESSURE: 106 MMHG | BODY MASS INDEX: 24.15 KG/M2 | WEIGHT: 123 LBS | SYSTOLIC BLOOD PRESSURE: 161 MMHG | HEIGHT: 60 IN | TEMPERATURE: 98 F | RESPIRATION RATE: 18 BRPM | HEART RATE: 75 BPM

## 2019-11-21 DIAGNOSIS — M71.30 SYNOVIAL CYST: Primary | ICD-10-CM

## 2019-11-21 DIAGNOSIS — M54.16 LEFT LUMBAR RADICULOPATHY: ICD-10-CM

## 2019-11-21 RX ORDER — IPRATROPIUM BROMIDE 42 UG/1
SPRAY, METERED NASAL
COMMUNITY

## 2019-11-21 RX ORDER — NAPROXEN 500 MG/1
TABLET ORAL
Qty: 60 TAB | Refills: 0 | Status: SHIPPED | OUTPATIENT
Start: 2019-11-21

## 2019-11-21 RX ORDER — AZELASTINE 1 MG/ML
2 SPRAY, METERED NASAL
COMMUNITY
Start: 2019-08-05

## 2019-11-21 NOTE — PATIENT INSTRUCTIONS
Back Care and Preventing Injuries: Care Instructions  Your Care Instructions    You can hurt your back doing many everyday activities: lifting a heavy box, bending down to garden, exercising at the gym, and even getting out of bed. But you can keep your back strong and healthy by doing some exercises. You also can follow a few tips for sitting, sleeping, and lifting to avoid hurting your back again. Talk to your doctor before you start an exercise program. Ask for help if you want to learn more about keeping your back healthy. Follow-up care is a key part of your treatment and safety. Be sure to make and go to all appointments, and call your doctor if you are having problems. It's also a good idea to know your test results and keep a list of the medicines you take. How can you care for yourself at home? · Stay at a healthy weight to avoid strain on your lower back. · Do not smoke. Smoking increases the risk of osteoporosis, which weakens the spine. If you need help quitting, talk to your doctor about stop-smoking programs and medicines. These can increase your chances of quitting for good. · Make sure you sleep in a position that maintains your back's normal curves and on a mattress that feels comfortable. Sleep on your side with a pillow between your knees, or sleep on your back with a pillow under your knees. These positions can reduce strain on your back. · When you get out of bed, lie on your side and bend both knees. Drop your feet over the edge of the bed as you push up with both arms. Scoot to the edge of the bed. Make sure your feet are in line with your rear end (buttocks), and then stand up. · If you must stand for a long time, put one foot on a stool, ledge, or box. Exercise to strengthen your back and other muscles  · Get at least 30 minutes of exercise on most days of the week. Walking is a good choice.  You also may want to do other activities, such as running, swimming, cycling, or playing tennis or team sports. · Stretch your back muscles. Here are few exercises to try:  ? Lie on your back with your knees bent and your feet flat on the floor. Gently pull one bent knee to your chest. Put that foot back on the floor, and then pull the other knee to your chest. Hold for 15 to 30 seconds. Repeat 2 to 4 times. ? Do pelvic tilts. Lie on your back with your knees bent. Tighten your stomach muscles. Pull your belly button (navel) in and up toward your ribs. You should feel like your back is pressing to the floor and your hips and pelvis are slightly lifting off the floor. Hold for 6 seconds while breathing smoothly. · Keep your core muscles strong. The muscles of your back, belly (abdomen), and buttocks support your spine. ? Pull in your belly, and imagine pulling your navel toward your spine. Hold this for 6 seconds, then relax. Remember to keep breathing normally as you tense your muscles. ? Do curl-ups. Always do them with your knees bent. Keep your low back on the floor, and curl your shoulders toward your knees using a smooth, slow motion. Keep your arms folded across your chest. If this bothers your neck, try putting your hands behind your neck (not your head), with your elbows spread apart. ? Lie on your back with your knees bent and your feet flat on the floor. Tighten your belly muscles, and then push with your feet and raise your buttocks up a few inches. Hold this position 6 seconds as you continue to breathe normally, then lower yourself slowly to the floor. Repeat 8 to 12 times. ? If you like group exercise, try Pilates or yoga. These classes have poses that strengthen the core muscles. Protect your back when you sit  · Place a small pillow, a rolled-up towel, or a lumbar roll in the curve of your back if you need extra support. · Sit in a chair that is low enough to let you place both feet flat on the floor with both knees nearly level with your hips.  If your chair or desk is too high, use a foot rest to raise your knees. · When driving, keep your knees nearly level with your hips. Sit straight, and drive with both hands on the steering wheel. Your arms should be in a slightly bent position. · Try a kneeling chair, which helps tilt your hips forward. This takes pressure off your lower back. · Try sitting on an exercise ball. It can rock from side to side, which helps keep your back loose. Lift properly  · Squat down, bending at the hips and knees only. If you need to, put one knee to the floor and extend your other knee in front of you, bent at a right angle (half kneeling). · Press your chest straight forward. This helps keep your upper back straight while keeping a slight arch in your low back. · Hold the load as close to your body as possible, at the level of your navel. · Use your feet to change direction, taking small steps. · Lead with your hips as you change direction. Keep your shoulders in line with your hips as you move. Do not twist your body. · Set down your load carefully, squatting with your knees and hips only. When should you call for help? Watch closely for changes in your health, and be sure to contact your doctor if you have any problems. Where can you learn more? Go to http://shawanda-steve.info/. Enter S810 in the search box to learn more about \"Back Care and Preventing Injuries: Care Instructions. \"  Current as of: June 26, 2019  Content Version: 12.2  © 1800-4571 [employer]. Care instructions adapted under license by [employer] (which disclaims liability or warranty for this information). If you have questions about a medical condition or this instruction, always ask your healthcare professional. [place] [address] any warranty or liability for your use of this information.

## 2019-11-21 NOTE — PROGRESS NOTES
[facility] [address]  [address], [address]  [place], [facility]  Phone: [phone]  Fax: [phone]        Dorene Cardenas  : 1955  PCP: Chandler Muller MD    PROGRESS NOTE      ASSESSMENT AND PLAN    Diagnoses and all orders for this visit:    1. Synovial cyst    2. Left lumbar radiculopathy  Comments:  improved    Other orders  -     naproxen (NAPROSYN) 500 mg tablet; Take 1 tab po every day to BID with food as needed for pain       1. Advised to continue HEP at least 3 times per week as preventive as symptoms may recur. .   2. Advised to avoid processed foods; rather eat fresh fruits, vegetables and meats to reduce inflammation   3. Discussed benefits and risk of NSAIDs  4. Avoid repetitive bending, lifting, and twisting   5. Rx for Naprosyn PRN. Recommend taking 3 days at start of flare. 6. Given information on preventing injury    F/U PRN      HISTORY OF PRESENT ILLNESS  Neftali Weir is a 59 y.o. female. Pt presents to the office for a f/u visit for left lumbar radiculopathy. Last OV referred to PT and given trial of Naprosyn. Pt reports benefit with PT and Naprosyn. She states she now does not have pain except rarely and does not take Naprosyn. Location of pain: low back occasionally  Does pain radiate into extremities: none BLE. L toe numbness intermittent  Does patient have weakness: no   Pt denies saddle paresthesias. Medications pt is on: Naprosyn PRN. Pt denies recent ED visits or hospitalizations. Denies persistent fevers, chills, weight changes, neurogenic bowel or bladder symptoms. Treatments patient has tried:  Physical therapy:Yes low back 2019 with benefit  Doing HEP: Yes daily  Non-opioid medications: Yes  Spinal injections: No  Spinal surgery- No.   Last L MRI 2019: narrowing with effusion on L at L4-5     reviewed. PMHx of HTN. Pt is unemployed outside the home.      Pain Assessment  2019 Location of Pain Back   Location Modifiers -   Severity of Pain 1   Quality of Pain Aching   Quality of Pain Comment -   Duration of Pain Persistent   Frequency of Pain Constant   Aggravating Factors Other (Comment)   Aggravating Factors Comment -   Limiting Behavior No   Relieving Factors Exercises   Result of Injury No       PAST MEDICAL HISTORY   Past Medical History:   Diagnosis Date    Hypertension        Past Surgical History:   Procedure Laterality Date    HX DILATION AND CURETTAGE     . MEDICATIONS      Current Outpatient Medications   Medication Sig Dispense Refill    azelastine (ASTELIN) 137 mcg (0.1 %) nasal spray 2 Sprays by Nasal route.  naproxen (NAPROSYN) 500 mg tablet Take 1 tab po every day to BID with food as needed for pain 60 Tab 0    ipratropium (ATROVENT) 42 mcg (0.06 %) nasal spray ipratropium bromide 42 mcg (0.06 %) nasal spray      amLODIPine (NORVASC) 10 mg tablet amlodipine 10 mg tablet      cetirizine (ZYRTEC) 10 mg tablet Take 10 mg by mouth.  montelukast (SINGULAIR) 10 mg tablet Singulair 10 mg tablet   Take 1 tablet every day by oral route.           ALLERGIES    Allergies   Allergen Reactions    Hydrocodone-Acetaminophen Unknown (comments)     Sick to stomach  Other reaction(s): gi distress, Vomiting  CAUSED NAUSEA, DIZZINESS      Erythromycin Unknown (comments)     Sick to stomach          SOCIAL HISTORY    Social History     Socioeconomic History    Marital status:      Spouse name: Not on file    Number of children: Not on file    Years of education: Not on file    Highest education level: Not on file   Occupational History    Not on file   Social Needs    Financial resource strain: Not on file    Food insecurity:     Worry: Not on file     Inability: Not on file    Transportation needs:     Medical: Not on file     Non-medical: Not on file   Tobacco Use    Smoking status: Never Smoker    Smokeless tobacco: Never Used   Substance and Sexual Activity    Alcohol use: Not on file    Drug use: Not on file    Sexual activity: Not on file   Lifestyle    Physical activity:     Days per week: Not on file     Minutes per session: Not on file    Stress: Not on file   Relationships    Social connections:     Talks on phone: Not on file     Gets together: Not on file     Attends Rastafarian service: Not on file     Active member of club or organization: Not on file     Attends meetings of clubs or organizations: Not on file     Relationship status: Not on file    Intimate partner violence:     Fear of current or ex partner: Not on file     Emotionally abused: Not on file     Physically abused: Not on file     Forced sexual activity: Not on file   Other Topics Concern     Service Not Asked    Blood Transfusions Not Asked    Caffeine Concern Not Asked    Occupational Exposure Not Asked   [place] Hazards Not Asked    Sleep Concern Not Asked    Stress Concern Not Asked    Weight Concern Not Asked    Special Diet Not Asked    Back Care Not Asked    Exercise Not Asked    Bike Helmet Not Asked   [address] Not Asked    Self-Exams Not Asked   Social History Narrative    Not on file     Socioeconomic History    Marital status:      Spouse name: Not on file    Number of children: Not on file    Years of education: Not on file    Highest education level: Not on file   Occupational History    Not on file   Social Needs    Financial resource strain: Not on file    Food insecurity:     Worry: Not on file     Inability: Not on file    Transportation needs:     Medical: Not on file     Non-medical: Not on file   Tobacco Use    Smoking status: Never Smoker    Smokeless tobacco: Never Used   Substance and Sexual Activity    Alcohol use: Not on file    Drug use: Not on file    Sexual activity: Not on file   Lifestyle    Physical activity:     Days per week: Not on file     Minutes per session: Not on file    Stress: Not on file Relationships    Social connections:     Talks on phone: Not on file     Gets together: Not on file     Attends Taoist service: Not on file     Active member of club or organization: Not on file     Attends meetings of clubs or organizations: Not on file     Relationship status: Not on file    Intimate partner violence:     Fear of current or ex partner: Not on file     Emotionally abused: Not on file     Physically abused: Not on file     Forced sexual activity: Not on file   Other Topics Concern     Service Not Asked    Blood Transfusions Not Asked    Caffeine Concern Not Asked    Occupational Exposure Not Asked   [place] Hazards Not Asked    Sleep Concern Not Asked    Stress Concern Not Asked    Weight Concern Not Asked    Special Diet Not Asked    Back Care Not Asked    Exercise Not Asked    Bike Helmet Not Asked    Seat Belt Not Asked    Self-Exams Not Asked   Social History Narrative    Not on file      Problem Relation Age of Onset    Heart Disease Mother     Diabetes Father     Cancer Sister        REVIEW OF SYSTEMS  Review of Systems   Constitutional: Negative for chills, fever and weight loss. Respiratory: Negative for shortness of breath. Cardiovascular: Negative for chest pain. Gastrointestinal: Negative for constipation. Negative for fecal incontinence   Genitourinary: Negative for dysuria. Negative for urinary incontinence   Musculoskeletal:        Per HPI   Skin: Negative for rash. Neurological: Positive for tingling. Negative for dizziness, tremors, focal weakness and headaches. Endo/Heme/Allergies: Does not bruise/bleed easily. Psychiatric/Behavioral: The patient does not have insomnia.         PHYSICAL EXAMINATION  Visit Vitals  BP (!) 161/106 (BP 1 Location: Right arm, BP Patient Position: Sitting) Comment: pt asymptomatic, has not taken bp med/rushing to get appt   Pulse 75   Temp 98 °F (36.7 °C) (Oral)   Resp 18   Ht 5' (1.524 m) Comment: pt states   Wt 123 lb (55.8 kg) Comment: pt states   SpO2 98%   BMI 24.02 kg/m²         Accompanied by family member. Constitutional:  Well developed, well nourished, in no acute distress. Psychiatric: Affect and mood are appropriate. Integumentary: No rashes or abrasions noted on exposed areas. Cardiovascular/Peripheral Vascular: No peripheral edema is noted BLE. SPINE/MUSCULOSKELETAL EXAM      Lumbar spine:  No rash, ecchymosis, or gross obliquity. No fasciculations. No focal atrophy is noted. Minimal Tenderness to palpation L L4-5. No tenderness to palpation at the sciatic notch. SI joints non-tender. Trochanters non tender. MOTOR:       Hip Flex  Quads Hamstrings Ankle DF EHL Ankle PF   Right NT toddler sleeping on her lap 4+/5 4+/5 +4/5 +4/5 +4/5   Left NT 4+/5 4+/5 +4/5 +4/5 +4/5       Straight Leg raise negative. Ambulation without assistive device. FWB. Written by Rodrigue Enrique, as dictated by Oliverio Owens MD.    I, Dr. Oliverio Owens MD, confirm that all documentation is accurate. Ms. Erica Kapadia may have a reminder for a \"due or due soon\" health maintenance. I have asked that she contact her primary care provider for follow-up on this health maintenance.

## 2025-09-04 ENCOUNTER — HOSPITAL ENCOUNTER (OUTPATIENT)
Facility: HOSPITAL | Age: 70
Discharge: HOME | End: 2025-09-04
Payer: MEDICARE

## 2025-09-04 ENCOUNTER — TRANSCRIBE ORDERS (OUTPATIENT)
Facility: HOSPITAL | Age: 70
End: 2025-09-04

## 2025-09-04 DIAGNOSIS — R49.0 DYSPHONIA OF ORGANIC TREMOR: ICD-10-CM

## 2025-09-04 DIAGNOSIS — R25.1 DYSPHONIA OF ORGANIC TREMOR: Primary | ICD-10-CM

## 2025-09-04 DIAGNOSIS — R49.0 DYSPHONIA OF ORGANIC TREMOR: Primary | ICD-10-CM

## 2025-09-04 DIAGNOSIS — R25.1 DYSPHONIA OF ORGANIC TREMOR: ICD-10-CM

## 2025-09-04 LAB
T4 FREE SERPL-MCNC: 1.2 NG/DL (ref 0.9–1.7)
TSH, 3RD GENERATION: 0.78 UIU/ML (ref 0.27–4.2)
VIT B12 SERPL-MCNC: 548 PG/ML (ref 211–911)

## 2025-09-04 PROCEDURE — 82607 VITAMIN B-12: CPT

## 2025-09-04 PROCEDURE — 84439 ASSAY OF FREE THYROXINE: CPT

## 2025-09-04 PROCEDURE — 84443 ASSAY THYROID STIM HORMONE: CPT

## 2025-09-04 PROCEDURE — 36415 COLL VENOUS BLD VENIPUNCTURE: CPT

## 2025-09-05 LAB
FAX TO NUMBER: NORMAL
TEST RESULTS FAXED TO: NORMAL